# Patient Record
Sex: FEMALE | Race: WHITE | Employment: OTHER | URBAN - NONMETROPOLITAN AREA
[De-identification: names, ages, dates, MRNs, and addresses within clinical notes are randomized per-mention and may not be internally consistent; named-entity substitution may affect disease eponyms.]

---

## 2021-01-14 PROBLEM — S82.401D CLOSED FRACTURE OF RIGHT TIBIA AND FIBULA WITH ROUTINE HEALING: Chronic | Status: ACTIVE | Noted: 2021-01-14

## 2021-01-14 PROBLEM — S82.402D CLOSED FRACTURE OF LEFT TIBIA AND FIBULA WITH ROUTINE HEALING: Status: ACTIVE | Noted: 2021-01-14

## 2021-01-14 PROBLEM — S82.202D CLOSED FRACTURE OF LEFT TIBIA AND FIBULA WITH ROUTINE HEALING: Status: ACTIVE | Noted: 2021-01-14

## 2021-01-14 PROBLEM — R62.51 FAILURE TO THRIVE (0-17): Status: ACTIVE | Noted: 2021-01-14

## 2021-01-14 PROBLEM — S82.201D CLOSED FRACTURE OF RIGHT TIBIA AND FIBULA WITH ROUTINE HEALING: Chronic | Status: ACTIVE | Noted: 2021-01-14

## 2021-01-14 PROBLEM — E66.01 MORBID OBESITY WITH BMI OF 60.0-69.9, ADULT (HCC): Status: ACTIVE | Noted: 2021-01-14

## 2021-01-18 PROBLEM — R62.51 FAILURE TO THRIVE (0-17): Status: RESOLVED | Noted: 2021-01-14 | Resolved: 2021-01-18

## 2023-09-07 ENCOUNTER — HOSPITAL ENCOUNTER (OUTPATIENT)
Dept: PHYSICAL THERAPY | Age: 63
Setting detail: THERAPIES SERIES
Discharge: HOME | End: 2023-09-07
Payer: MEDICARE

## 2023-09-07 PROCEDURE — 97161 PT EVAL LOW COMPLEX 20 MIN: CPT

## 2023-09-07 PROCEDURE — 97110 THERAPEUTIC EXERCISES: CPT

## 2023-09-07 NOTE — PLAN OF CARE
[facility]           Phone: [phone]             Outpatient Physical Therapy  Fax: [phone]                                           Date: 2023  Patient: Bishop Vasquez : 1960 CSN #: [id number]   Referring Physician: Bhavana Serna MD      [x] Plan of Care   [] Updated Plan of Care    Dates of Service to Include: 2023 to 10/06/23    Diagnosis:  Lumbar sciatica, M53.86; Inability to walk, R26.2    Rehab (Treatment) Diagnosis:  General weakness             Onset Date:       Attendance  Total # of Visits to Date: 1 No Show: 0 Canceled Appointment: 0    Assessment  Assessment: Patient is 58year old female with dx of lumbar sciatica and inability to walk who presents with increased tailbone pain 7/10 on average that is worse with prolonged sitting. Pt referred here by doctor for aquatic therapy but pt currently unable to enter pool d/t 300lb weight limit of lift chair. Patient uses manual w/c for primary mobility and her daughter pushes her from behind. Pt sits with fwd head and shoulders and lumbar hyperlordosis. Patient has not walked since  when she had B tib/fib fractures and transfers with a slideboard. She has not stood up fully a long time, she has a FWW at home. Patient to benefit from physical therapy to improve general strength and functional mobility/transfers to decrease tailbone pain and decrease fall risk. Goals  Short Term Goals  Time Frame for Short Term Goals: 3 weeks  Short Term Goal 1: Patient to initiate HEP for general B LE/UE strengthening. Short Term Goal 2: Patient to complete slideboard transfers with CG/minAx1 from w/c to low mat table and back with no LOB to improve mobility. Short Term Goal 3: Patient to tolerate 45 min of ther ex/act with minimal rest breaks to improve functional endurance.   Long Term Goals  Time Frame for Long Term Goals : 6 weeks  Long Term

## 2023-09-07 NOTE — PROGRESS NOTES
Phone: [address]          Fax: [phone]                      Outpatient Physical Therapy                                                                      Evaluation  Date: 2023  Patient: Melissa Trinidad  : 1960  [facility] #: [id number]    Referring Physician: Srikanth Ji MD    Medical Diagnosis: Lumbar sciatica, M53.86; Inability to walk, R26.2    Treatment Diagnosis: General weakness  PT Insurance Information: [employer]  Total # of Visits Approved: 12   Total # of Visits to Date: 1  No Show: 0  Canceled Appointment: 0     Subjective  Subjective: Patient reports B tib/fib fractures in  and has been unable to walk since then. Pt reports house is w/c accessible and she transfers from to bed to w/c without assistance but needs help getting from w/c back to bed. Pt has FWW at home. Pt has regular bed and reports tailbone pain. Pt denies any open wounds. Pt uses slideboard for transfers at home. She has not stood in a while. Additional Pertinent Hx: Arthritis, HTN, panic attacks    Observations:   General Observations  Description: Patient uses manual w/c for primary mobility and her daughter pushes her from behind. Pt sits with fwd head and shoulders and lumbar hyperlordosis. Pt attempted sit/stand transfer from manual w/c with FWW and CGAx1 but unable to clear buttocks d/t pain and weakness. Pt refuses second attempt with assist of 2 d/t fear of increased pain at this time.     Objective    Strength       Strength LLE  L Hip Flexion: 3+/5  L Hip ABduction: 4-/5  L Hip ADduction: 4/5  L Knee Flexion: 4-/5  L Knee Extension: 4-/5  L Ankle Dorsiflexion: 4-/5       Special Tests:     Strength RLE  R Hip Flexion: 3+/5  R Hip ABduction: 4-/5  R Hip ADduction: 4-/5  R Knee Flexion: 4-/5  R Knee Extension: 3+/5  R Ankle Dorsiflexion: 4-/5  Strength LLE  L Hip Flexion: 3+/5  L Hip ABduction: 4-/5  L Hip ADduction: 4/5  L Knee Flexion: 4-/5  L Knee